# Patient Record
Sex: FEMALE
[De-identification: names, ages, dates, MRNs, and addresses within clinical notes are randomized per-mention and may not be internally consistent; named-entity substitution may affect disease eponyms.]

---

## 2017-08-22 ENCOUNTER — TRANSCRIPTION ENCOUNTER (OUTPATIENT)
Age: 67
End: 2017-08-22

## 2017-11-01 ENCOUNTER — TRANSCRIPTION ENCOUNTER (OUTPATIENT)
Age: 67
End: 2017-11-01

## 2020-06-27 ENCOUNTER — TRANSCRIPTION ENCOUNTER (OUTPATIENT)
Age: 70
End: 2020-06-27

## 2020-12-14 ENCOUNTER — TRANSCRIPTION ENCOUNTER (OUTPATIENT)
Age: 70
End: 2020-12-14

## 2021-06-13 ENCOUNTER — TRANSCRIPTION ENCOUNTER (OUTPATIENT)
Age: 71
End: 2021-06-13

## 2022-07-07 ENCOUNTER — NON-APPOINTMENT (OUTPATIENT)
Age: 72
End: 2022-07-07

## 2022-07-08 ENCOUNTER — TRANSCRIPTION ENCOUNTER (OUTPATIENT)
Age: 72
End: 2022-07-08

## 2022-07-11 ENCOUNTER — EMERGENCY (EMERGENCY)
Facility: HOSPITAL | Age: 72
LOS: 1 days | Discharge: HOME | End: 2022-07-11
Admitting: EMERGENCY MEDICINE

## 2022-07-11 ENCOUNTER — APPOINTMENT (OUTPATIENT)
Age: 72
End: 2022-07-11

## 2022-07-11 VITALS
DIASTOLIC BLOOD PRESSURE: 96 MMHG | WEIGHT: 199.96 LBS | TEMPERATURE: 99 F | OXYGEN SATURATION: 98 % | SYSTOLIC BLOOD PRESSURE: 184 MMHG | RESPIRATION RATE: 18 BRPM | HEART RATE: 108 BPM

## 2022-07-11 VITALS
DIASTOLIC BLOOD PRESSURE: 89 MMHG | HEART RATE: 105 BPM | SYSTOLIC BLOOD PRESSURE: 176 MMHG | OXYGEN SATURATION: 97 % | TEMPERATURE: 99 F | RESPIRATION RATE: 18 BRPM

## 2022-07-11 PROCEDURE — L9998: CPT

## 2022-07-11 RX ORDER — BEBTELOVIMAB 87.5 MG/ML
175 INJECTION, SOLUTION INTRAVENOUS ONCE
Refills: 0 | Status: COMPLETED | OUTPATIENT
Start: 2022-07-11 | End: 2022-07-11

## 2022-07-11 RX ADMIN — BEBTELOVIMAB 175 MILLIGRAM(S): 87.5 INJECTION, SOLUTION INTRAVENOUS at 09:57

## 2022-07-11 NOTE — CHART NOTE - NSCHARTNOTEFT_GEN_A_CORE
This is a 71y Female with hx of cancer, DM II who presents for monoclonal antibody treatment. Patient tested positive for COVID on 7/8. Patient does not require supplemental oxygen.     Vital signs throughout treatment: T(C): 37 (07-11-22 @ 09:39), Max: 37 (07-11-22 @ 09:39)  HR: 108 (07-11-22 @ 09:39) (108 - 108)  BP: 184/96 (07-11-22 @ 09:39) (184/96 - 184/96)  RR: 18 (07-11-22 @ 09:39) (18 - 18)  SpO2: 98% (07-11-22 @ 09:39) (98% - 98%)    Patient educated on risks/ benefits of MAB treatment. Patient informed of potential side effects such as rash, fatigue, diarrhea. Consent signed and witnessed by RN. Patient tolerated procedure with no adverse reaction. Patient observed for one hour post treatment. Patient educated on discharge information.

## 2022-07-12 DIAGNOSIS — E11.9 TYPE 2 DIABETES MELLITUS WITHOUT COMPLICATIONS: ICD-10-CM

## 2022-07-12 DIAGNOSIS — U07.1 COVID-19: ICD-10-CM

## 2022-07-12 DIAGNOSIS — C80.1 MALIGNANT (PRIMARY) NEOPLASM, UNSPECIFIED: ICD-10-CM

## 2022-11-30 ENCOUNTER — NON-APPOINTMENT (OUTPATIENT)
Age: 72
End: 2022-11-30

## 2023-01-16 ENCOUNTER — NON-APPOINTMENT (OUTPATIENT)
Age: 73
End: 2023-01-16

## 2023-09-16 ENCOUNTER — NON-APPOINTMENT (OUTPATIENT)
Age: 73
End: 2023-09-16

## 2023-10-31 ENCOUNTER — NON-APPOINTMENT (OUTPATIENT)
Age: 73
End: 2023-10-31